# Patient Record
(demographics unavailable — no encounter records)

---

## 2025-07-15 NOTE — PHYSICAL EXAM
[Normal] : clear lung fields, good air entry, no respiratory distress [No Edema] : no edema [Normal Radial B/L] : normal radial B/L [Normal DP B/L] : normal DP B/L [Normal Speech] : normal speech [Alert and Oriented] : alert and oriented

## 2025-07-16 NOTE — ADDENDUM
[FreeTextEntry1] : Please note the patient was reviewed with PA, Priyanka Durham. I was physically present during the service of the patient. I was directly involved in the management plan and recommendations of the care provided to the patient. I personally reviewed the history and physical examination as documented by the PA above.   Chantel Payne MD, FACC, [facility]

## 2025-07-16 NOTE — ADDENDUM
[FreeTextEntry1] : Please note the patient was reviewed with PA, Priyanka Durham. I was physically present during the service of the patient. I was directly involved in the management plan and recommendations of the care provided to the patient. I personally reviewed the history and physical examination as documented by the PA above.   Chantel Payne MD, FACC, [facility]

## 2025-07-16 NOTE — DISCUSSION/SUMMARY
[FreeTextEntry1] : BECKY DOTSON  is a 49 year M  who presents today Jul 16, 2025 with the above history and the following active issues.   NSVT.  No syncope.  No sudden cardiac death in the family.  Now on metoprolol with no significant high risk arrhythmias on last event monitor.  Symptoms are minimal with occasional fluttering and inconsistent beating. There were no significant exercise-induced arrhythmias. His coronary calcium score is 0, normal LVEF and normal cardiac MRI. The likelihood of NSVT causing significant high risk symptoms are low. Continue metoprolol 25 mg.  He has no side effects. Possible mild erectile dysfunction. He has no symptoms of dizziness lightheadedness near syncopal or syncopal event. High risk symptoms he will contact us immediately.  Dyslipidemia.  Low 10-year ASCVD risk risk.  No evidence of ASCVD at present.  No ASCVD events. Lifestyle modifications reviewed. Risk benefits alternatives of statin therapy discussed. Will wait at present and follow-up labs in a year.  Considering lifetime risk is high may need to consider starting statin therapy at some point. Follow up labs including CRP, Lipoprotein A, Apolipoprotein A1 and Apolipoprotein B.   Mild erectile dysfunction.  Risk benefits alternatives of medication like sildenafil reviewed.  Side effects discussed.  Low-dose of medication prescribed.    Any high risk symptoms he will call 911 and go to nearest emergency room  Red flag symptoms which would warrant sooner emergent evaluation reviewed with the patient.  Questions and concerns were addressed and answered.   Sincerely,  Priyanka Durham PA-C Patients history, testing and plan reviewed with supervising MD: Dr. Chantel Payne

## 2025-07-16 NOTE — HISTORY OF PRESENT ILLNESS
[FreeTextEntry1] : BECKY DOTSON  is a 49 year M  who presents today Jul 16, 2025 for yearly follow up.  Since last seen he has been feeling well from a cardiovascular perspective. There has been no recent illness or hospital stay. Medications have remained unchanged. Asymptomatic from cardiovascular and arrhythmia standpoint. Today he denies chest pain, pressure, unusual shortness of breath, lightheadedness, dizziness, near syncope or syncope. He is exercising on a regular basis with no new exertional complaints. Labs from 4/2025 reviewed and LDL was 142.    As you know he was seen in the past for intermittent fluttering sensation.  NSVT was noted.  He had echocardiogram which showed preserved EF.  MRI did not reveal any significant abnormality.  Stress test was nonischemic.  Coronary calcium score was 0. He has no chest pain.  No PND orthopnea.  No dizziness near syncopal syncopal event. Stable weight and diet No increased alcohol intake History of anxiety  He has no significant history of hypertension, coronary artery disease, cerebrovascular accident, peripheral arterial disease, rheumatic fever, thyroid or Lyme disease. He does not do regular aerobic exercises but does do weightlifting on a regular basis without symptoms  No family history of premature coronary artery disease or sudden cardiac death. He is non-smoker.  He has 1-2 drinks of caffeinated beverage a day. Most of the time social alcohol consumption

## 2025-07-16 NOTE — REVIEW OF SYSTEMS
[Erectile Dysfunction] : erectile dysfunction [Negative] : Heme/Lymph [FreeTextEntry5] : As noted above

## 2025-07-16 NOTE — REASON FOR VISIT
[Symptom and Test Evaluation] : symptom and test evaluation [Arrhythmia/ECG Abnorrmalities] : arrhythmia/ECG abnormalities [FreeTextEntry3] : Dr. Manley

## 2025-07-16 NOTE — CARDIOLOGY SUMMARY
[de-identified] : August 8, 2022 normal sinus rhythm incomplete right bundle branch block  18 2023.  Normal sinus rhythm incomplete right bundle branch block  August 22, 2024.  Normal sinus rhythm normal intervals. [de-identified] : Event monitor May 2023.  Overall normal sinus rhythm PACs and PVCs.  Less than 1% burden. [de-identified] : Exercise treadmill stress test.  August 15, 2022.  13 minutes 28 seconds of Panchito protocol 14 METS.  No symptoms.  No evidence of exercise-induced ischemia [de-identified] : August 15, 2022.  EF 55%.

## 2025-07-16 NOTE — CARDIOLOGY SUMMARY
[de-identified] : August 8, 2022 normal sinus rhythm incomplete right bundle branch block  18 2023.  Normal sinus rhythm incomplete right bundle branch block  August 22, 2024.  Normal sinus rhythm normal intervals. [de-identified] : Event monitor May 2023.  Overall normal sinus rhythm PACs and PVCs.  Less than 1% burden. [de-identified] : Exercise treadmill stress test.  August 15, 2022.  13 minutes 28 seconds of Panchito protocol 14 METS.  No symptoms.  No evidence of exercise-induced ischemia [de-identified] : August 15, 2022.  EF 55%.